# Patient Record
Sex: FEMALE | Race: WHITE | NOT HISPANIC OR LATINO | Employment: STUDENT | URBAN - METROPOLITAN AREA
[De-identification: names, ages, dates, MRNs, and addresses within clinical notes are randomized per-mention and may not be internally consistent; named-entity substitution may affect disease eponyms.]

---

## 2023-09-28 PROBLEM — R56.9 SEIZURE, CONVULSION (MULTI): Status: ACTIVE | Noted: 2023-09-28

## 2023-09-28 PROBLEM — F41.1 GENERALIZED ANXIETY DISORDER: Status: ACTIVE | Noted: 2023-09-28

## 2023-09-28 PROBLEM — F90.0 ATTENTION DEFICIT HYPERACTIVITY DISORDER (ADHD), PREDOMINANTLY INATTENTIVE TYPE: Status: ACTIVE | Noted: 2023-09-28

## 2023-09-28 PROBLEM — R53.83 FATIGUE: Status: ACTIVE | Noted: 2023-09-28

## 2023-09-28 PROBLEM — R55 EPISODE OF LOSS OF CONSCIOUSNESS: Status: ACTIVE | Noted: 2023-09-28

## 2023-09-28 PROBLEM — M25.851 FEMOROACETABULAR IMPINGEMENT OF RIGHT HIP: Status: ACTIVE | Noted: 2023-09-28

## 2023-09-28 PROBLEM — M25.852 FEMOROACETABULAR IMPINGEMENT OF LEFT HIP: Status: ACTIVE | Noted: 2023-09-28

## 2023-09-28 PROBLEM — M76.02 GLUTEAL TENDINITIS OF BOTH BUTTOCKS: Status: ACTIVE | Noted: 2023-09-28

## 2023-09-28 PROBLEM — M76.01 GLUTEAL TENDINITIS OF BOTH BUTTOCKS: Status: ACTIVE | Noted: 2023-09-28

## 2023-09-28 PROBLEM — F50.9 EATING DISORDER: Status: ACTIVE | Noted: 2023-09-28

## 2023-09-28 PROBLEM — F34.1 PERSISTENT DEPRESSIVE DISORDER WITH MIXED FEATURES, CURRENTLY MODERATE: Status: ACTIVE | Noted: 2023-09-28

## 2023-09-28 RX ORDER — EPINEPHRINE 0.3 MG/.3ML
INJECTION, SOLUTION INTRAMUSCULAR
COMMUNITY
Start: 2015-05-30

## 2023-09-28 RX ORDER — MIRTAZAPINE 7.5 MG/1
1 TABLET, FILM COATED ORAL NIGHTLY
COMMUNITY
Start: 2023-03-28 | End: 2023-10-07 | Stop reason: SDUPTHER

## 2023-09-28 RX ORDER — DEXTROAMPHETAMINE SACCHARATE, AMPHETAMINE ASPARTATE MONOHYDRATE, DEXTROAMPHETAMINE SULFATE AND AMPHETAMINE SULFATE 2.5; 2.5; 2.5; 2.5 MG/1; MG/1; MG/1; MG/1
1 CAPSULE, EXTENDED RELEASE ORAL DAILY
COMMUNITY
Start: 2023-01-12 | End: 2023-11-07 | Stop reason: SINTOL

## 2023-09-28 RX ORDER — SERTRALINE HYDROCHLORIDE 100 MG/1
1 TABLET, FILM COATED ORAL DAILY
COMMUNITY
Start: 2021-11-30 | End: 2023-11-07 | Stop reason: WASHOUT

## 2023-09-28 RX ORDER — FEXOFENADINE HCL AND PSEUDOEPHEDRINE HCI 180; 240 MG/1; MG/1
1 TABLET, EXTENDED RELEASE ORAL DAILY
COMMUNITY
Start: 2022-03-11

## 2023-09-28 RX ORDER — AZELASTINE 1 MG/ML
2 SPRAY, METERED NASAL 2 TIMES DAILY
COMMUNITY
Start: 2022-03-11

## 2023-09-28 RX ORDER — METHYLPHENIDATE HYDROCHLORIDE 27 MG/1
1 TABLET ORAL DAILY
COMMUNITY
Start: 2022-06-28 | End: 2023-11-07 | Stop reason: SINTOL

## 2023-09-28 RX ORDER — CETIRIZINE HYDROCHLORIDE 10 MG/1
TABLET ORAL
COMMUNITY
End: 2023-11-07 | Stop reason: WASHOUT

## 2023-09-28 RX ORDER — ATOMOXETINE 40 MG/1
CAPSULE ORAL
COMMUNITY
Start: 2023-08-10 | End: 2023-11-07 | Stop reason: SINTOL

## 2023-09-28 RX ORDER — MOMETASONE FUROATE 50 UG/1
SPRAY, METERED NASAL
COMMUNITY

## 2023-09-28 RX ORDER — CLONAZEPAM 2 MG/1
2 TABLET, ORALLY DISINTEGRATING ORAL AS NEEDED
COMMUNITY
Start: 2021-05-17 | End: 2023-11-07 | Stop reason: WASHOUT

## 2023-10-03 ENCOUNTER — APPOINTMENT (OUTPATIENT)
Dept: BEHAVIORAL HEALTH | Facility: CLINIC | Age: 18
End: 2023-10-03
Payer: COMMERCIAL

## 2023-10-07 DIAGNOSIS — F41.1 GENERALIZED ANXIETY DISORDER: ICD-10-CM

## 2023-10-07 DIAGNOSIS — F99 INSOMNIA DUE TO OTHER MENTAL DISORDER: Chronic | ICD-10-CM

## 2023-10-07 DIAGNOSIS — F51.05 INSOMNIA DUE TO OTHER MENTAL DISORDER: Chronic | ICD-10-CM

## 2023-10-07 PROBLEM — G47.00 INSOMNIA: Chronic | Status: ACTIVE | Noted: 2023-10-07

## 2023-10-07 RX ORDER — MIRTAZAPINE 7.5 MG/1
7.5 TABLET, FILM COATED ORAL NIGHTLY
Qty: 30 TABLET | Refills: 11 | Status: SHIPPED | OUTPATIENT
Start: 2023-10-07 | End: 2023-11-07 | Stop reason: SDUPTHER

## 2023-11-06 PROBLEM — R56.9 SEIZURE, CONVULSION (MULTI): Status: RESOLVED | Noted: 2023-09-28 | Resolved: 2023-11-06

## 2023-11-06 PROBLEM — R55 EPISODE OF LOSS OF CONSCIOUSNESS: Status: RESOLVED | Noted: 2023-09-28 | Resolved: 2023-11-06

## 2023-11-06 NOTE — PROGRESS NOTES
"Outpatient Child and Adolescent Psychiatry Follow-Up    Subjective   Emelia Box is a 18 y.o. female presenting for psychiatric follow-up.   Patient evaluated virtually     Chief Complaint:  Med Management and Follow-up    HPI:   Patient last evaluated 9/5, at which time atomoxetine was re-ordered with 90-d prescription due to insurance coverage concerns.     Update:   Overall, things are going \"pretty good\"   Trial of atomoxetine: \"I did not like it; not a fan\" - took it for a while (about a month), and then stopped due to similar adverse effects as stimulants (irritability, anxiety).   Vortioxetine: going fine, but about to run out (lost a bottle).    College ([employer], ~40min from home): \"Fine\" - much better than prior experience; \"most of my college are regular college problems\". Now living in a campus apartment (had to move from the dorm due to infrastructure problems; now in an apartment but far from campus. Not super close to roommate, but the situation is fine. 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   Sleep: \"fine\" / no concerns   Eating: good / no concerns   Depression: recent severity ~3-4/10 (from 4)   Anxiety: recent severity ~5/10 (from 4)   ADHD related concerns: \"I don't know\" - wondering if some element of procrastination and lack of motivation was situational (not liking where she went to school, etc); overall at [employer] \"I have been better at that general thing\"   Substances: denies all   Upcoming life events: looking forward to the end of the semester, but not to the holidays (family stress, unsure about excitement about work - but makes really good money).   Therapy: Hasn't been regularly engaging in therapy \"I know I should but I really hate it\"; \"I definitely will do it over the summer\"     Psychotropic management history:  8/10/23: initiated atomoxetine (40-80mg daily) for ADHD (prior intolerance of " "both methylphenidate and amphetamine stimulant formulations due to anxiety / agitation / irritability)  5/16/23: resumed mirtazapine 7.5mg qHS (intolerance of 15mg due to fatigue), discontinued Adderall XR 10mg (intolerance due to anxiety exacerbation)   4/13/23: increased mirtazapine to 15mg qHS due to good tolerance and partial response (brief duration of effect on eating)  3/28/23: added mirtazapine 7.5mg qHS for treatment of difficulty eating as well as sleep initiation insomnia   1/13/23: increased vortioxetine to 20mg daily (good tolerance, no noted benefits); switched from Concerta 27mg (good clinical benefit but not tolerated due to significant irritability) to Adderall XR 10mg   12/13/22: increased vortioxetine to 15mg daily   11/15/22: cross-tapererd from sertraline (100mg qAM) to vortioxetine 10mg daily   10/28/22: discontinued buspirone due to lack of perceived benefit and recent symptom exacerbation   10/17/22: continued uptitration of buspirone due to good tolerance without perceived benefit   10/4/22: initiated buspirone 10mg twice daily (plan for uptitration as tolerated); continued methylphenidate & sertraline   6/28/22: increased methylphenidate ER to 27mg qAM; continued sertraline 100mg qAM   6/17/22: initiated methylphenidate ER 18mg qAM for ADHD; continued sertraline 100mg qAM   11/5/22: increased sertraline to 100mg qAM   10/8/21: initiated cross-titration from bupropion XL 300mg daily, to sertraline 50mg daily, over 2 weeks.      Mental Status Exam:   Patient appropriately groomed, dressed in hospital gown; appearance is consistent with chronological age. Patient is calm and cooperative with appropriate eye contact; no psychomotor agitation or retardation and no EPS/TD noted. Speech with normal rate and rhythm, appropriate volume and tone; spontaneous and fluent. Patient states that mood is \"pretty good\", affect congruent with stated mood and with appropriate range. Thought process is " organized, linear, and goal directed with logical associations; patient does not endorse ideation of harm to self or others, does not endorse auditory or visual hallucinations, and does not appear to be responding to internal stimuli. No apparent deficits in memory, attention, concentration or language; fund of knowledge appears adequate for development and education. Insight and judgment are fair.     Vitals:   There were no vitals filed for this visit.    Current Medications:    Current Outpatient Medications:     amphetamine-dextroamphetamine XR (Adderall XR) 10 mg 24 hr capsule, Take 1 capsule (10 mg) by mouth once daily., Disp: , Rfl:     atomoxetine (Strattera) 40 mg capsule, Take one (1) capsule every morning for 3-5 days; if tolerating well, increase to two (2) capsules daily thereafter, Disp: , Rfl:     azelastine (Astelin) 137 mcg (0.1 %) nasal spray, Administer 2 sprays into each nostril 2 times a day., Disp: , Rfl:     cetirizine (ZyrTEC) 10 mg tablet, Take by mouth., Disp: , Rfl:     clonazePAM (KlonoPIN) 2 mg disintegrating tablet, Take 1 tablet (2 mg) by mouth if needed for seizures., Disp: , Rfl:     EPINEPHrine (Auvi-Q) 0.3 mg/0.3 mL injection syringe, Auvi-Q 0.3 MG/0.3ML Injection Solution Auto-injector Quantity: 2  Refills: 0  Start: 30-May-2015, Disp: , Rfl:     fexofenadine-pseudoephedrine (Allegra-D 24 Hour) 180-240 mg 24 hr tablet, Take 1 tablet by mouth once daily., Disp: , Rfl:     methylphenidate (Concerta) 27 mg daily tablet, Take 1 tablet (27 mg) by mouth once daily., Disp: , Rfl:     mirtazapine (Remeron) 7.5 mg tablet, Take 1 tablet (7.5 mg) by mouth once daily at bedtime., Disp: 30 tablet, Rfl: 11    mometasone (Nasonex) 50 mcg/actuation nasal spray, Administer into affected nostril(s)., Disp: , Rfl:     sertraline (Zoloft) 100 mg tablet, Take 1 tablet (100 mg) by mouth once daily., Disp: , Rfl:     vortioxetine (Trintellix) 20 mg tablet tablet, Take 1 tablet (20 mg) by mouth once  daily., Disp: , Rfl:     Assessment/Plan   Assessment:   Emelia Box is a 18 y.o. female  presenting for follow-up.     Diagnosis: No diagnosis found.     As of 11/07/2023:   Poor tolerance of atomoxetine; will discontinue - discussed options to trial alpha agonist for treatment of ADHD; will defer at present   Continue medications otherwise     Safety Risk Assessment: Based on her risk and protective factors, patient is presently at low risk of imminent suicide (risk factors include young age / psychiatric symptoms; protective factors include future orientation / treatment engagement). In addition, routine evaluation did not reveal any evidence indicating that the patient poses a substantial risk of imminent physical harm to others. As such, she does not currently meet criteria for (involuntary) psychiatric admission; as per discussion with patient and guardian the plan to mitigate her dynamic risk factors for harm towards self or others includes ongoing outpatient psychiatric care.    Treatment Plan/Recommendations:     Medications: Patient and guardian were educated on all medication changes including indications / alternatives / likely or potentially serious adverse effects, and verbalized understanding and consent to the regimen below.   Discontinue atomoxetine due to intolerance   Continue mirtazapine 7.5mg qHS for insomnia and eating difficulties (poor tolerance of 15mg dose due to fatigue)   Continue vortioxetine 20mg daily      Care coordination: Patient and guardian instructed to contact the clinic via MyChart or phone with medication questions or concerns, and were provided with resources and instructions for safety planning (including instructions to call / text / chat [phone], and to present to the nearest ED for imminent safety concerns).   Therapy: Agree with resuming counseling/therapy services (Katherin Pyle / [facility]) during the summer      Follow-up:   Return to clinic Tues Feb 13 at 1pm or sooner as needed.   Will continue to follow ADHD symptoms; may consider trial of alpha agonist (prior poor tolerance of multiple stimulants as well as atomoxetine)     Russell Erickson MD PhD

## 2023-11-07 ENCOUNTER — TELEMEDICINE (OUTPATIENT)
Dept: BEHAVIORAL HEALTH | Facility: CLINIC | Age: 18
End: 2023-11-07
Payer: COMMERCIAL

## 2023-11-07 DIAGNOSIS — F41.1 GENERALIZED ANXIETY DISORDER: ICD-10-CM

## 2023-11-07 DIAGNOSIS — F51.05 INSOMNIA DUE TO OTHER MENTAL DISORDER: Chronic | ICD-10-CM

## 2023-11-07 DIAGNOSIS — F99 INSOMNIA DUE TO OTHER MENTAL DISORDER: Chronic | ICD-10-CM

## 2023-11-07 DIAGNOSIS — F51.04 PSYCHOPHYSIOLOGICAL INSOMNIA: Chronic | ICD-10-CM

## 2023-11-07 DIAGNOSIS — F34.1 PERSISTENT DEPRESSIVE DISORDER WITH MIXED FEATURES, CURRENTLY MODERATE: ICD-10-CM

## 2023-11-07 DIAGNOSIS — F90.0 ATTENTION DEFICIT HYPERACTIVITY DISORDER (ADHD), PREDOMINANTLY INATTENTIVE TYPE: ICD-10-CM

## 2023-11-07 PROCEDURE — 99214 OFFICE O/P EST MOD 30 MIN: CPT | Performed by: STUDENT IN AN ORGANIZED HEALTH CARE EDUCATION/TRAINING PROGRAM

## 2023-11-07 RX ORDER — MIRTAZAPINE 7.5 MG/1
7.5 TABLET, FILM COATED ORAL NIGHTLY
Qty: 90 TABLET | Refills: 3 | Status: SHIPPED | OUTPATIENT
Start: 2023-11-07 | End: 2024-05-09 | Stop reason: SDUPTHER

## 2023-11-07 NOTE — PATIENT INSTRUCTIONS
Please take the following medications as prescribed:     mirtazapine (Remeron) 7.5 mg tablet, Take 1 tablet (7.5 mg) by mouth once daily at bedtime., Disp: 90 tablet, Rfl: 3    vortioxetine (Trintellix) 20 mg tablet tablet, Take 1 tablet (20 mg) by mouth once daily., Disp: 90 tablet, Rfl: 3     You are scheduled for a follow-up appointment Tues Feb 13 at 1pm. Please call [phone] if you would like to be seen sooner or need to reschedule for any reason.    Please contact the clinic through [employer] or by phone ([phone]) for any medication questions or concerns.   For urgent support with concerns including suicidal thoughts call or text [id number]), or chat to [email or web address] - services are available 24 hours per day, 7 days per week.   If you are worried about imminent risk of harm to self or others, immediately seek medical care by calling 911 or presenting to the nearest emergency department.

## 2024-02-13 ENCOUNTER — APPOINTMENT (OUTPATIENT)
Dept: BEHAVIORAL HEALTH | Facility: CLINIC | Age: 19
End: 2024-02-13
Payer: COMMERCIAL

## 2024-05-09 DIAGNOSIS — F51.05 INSOMNIA DUE TO OTHER MENTAL DISORDER: Chronic | ICD-10-CM

## 2024-05-09 DIAGNOSIS — F41.1 GENERALIZED ANXIETY DISORDER: ICD-10-CM

## 2024-05-09 DIAGNOSIS — F99 INSOMNIA DUE TO OTHER MENTAL DISORDER: Chronic | ICD-10-CM

## 2024-05-09 DIAGNOSIS — F34.1 PERSISTENT DEPRESSIVE DISORDER WITH MIXED FEATURES, CURRENTLY MODERATE: ICD-10-CM

## 2024-05-09 RX ORDER — MIRTAZAPINE 7.5 MG/1
7.5 TABLET, FILM COATED ORAL NIGHTLY
Qty: 90 TABLET | Refills: 3 | Status: SHIPPED | OUTPATIENT
Start: 2024-05-09 | End: 2025-05-09

## 2024-11-15 DIAGNOSIS — F41.1 GENERALIZED ANXIETY DISORDER: ICD-10-CM

## 2024-11-15 DIAGNOSIS — F34.1 PERSISTENT DEPRESSIVE DISORDER WITH MIXED FEATURES, CURRENTLY MODERATE: ICD-10-CM

## 2024-11-15 DIAGNOSIS — F99 INSOMNIA DUE TO OTHER MENTAL DISORDER: Chronic | ICD-10-CM

## 2024-11-15 DIAGNOSIS — F51.05 INSOMNIA DUE TO OTHER MENTAL DISORDER: Chronic | ICD-10-CM

## 2024-11-15 RX ORDER — MIRTAZAPINE 7.5 MG/1
7.5 TABLET, FILM COATED ORAL NIGHTLY
Qty: 90 TABLET | Refills: 0 | Status: SHIPPED | OUTPATIENT
Start: 2024-11-15 | End: 2025-02-13

## 2024-12-19 DIAGNOSIS — F99 INSOMNIA DUE TO OTHER MENTAL DISORDER: Chronic | ICD-10-CM

## 2024-12-19 DIAGNOSIS — F41.1 GENERALIZED ANXIETY DISORDER: ICD-10-CM

## 2024-12-19 DIAGNOSIS — F51.05 INSOMNIA DUE TO OTHER MENTAL DISORDER: Chronic | ICD-10-CM

## 2024-12-19 DIAGNOSIS — F34.1 PERSISTENT DEPRESSIVE DISORDER WITH MIXED FEATURES, CURRENTLY MODERATE: ICD-10-CM

## 2024-12-19 RX ORDER — MIRTAZAPINE 7.5 MG/1
7.5 TABLET, FILM COATED ORAL NIGHTLY
Qty: 30 TABLET | Refills: 2 | Status: SHIPPED | OUTPATIENT
Start: 2024-12-19 | End: 2025-03-19

## 2024-12-24 ENCOUNTER — APPOINTMENT (OUTPATIENT)
Dept: BEHAVIORAL HEALTH | Facility: CLINIC | Age: 19
End: 2024-12-24
Payer: COMMERCIAL

## 2025-01-07 ENCOUNTER — APPOINTMENT (OUTPATIENT)
Dept: BEHAVIORAL HEALTH | Facility: CLINIC | Age: 20
End: 2025-01-07
Payer: COMMERCIAL

## 2025-01-07 DIAGNOSIS — F99 INSOMNIA DUE TO OTHER MENTAL DISORDER: Chronic | ICD-10-CM

## 2025-01-07 DIAGNOSIS — F51.05 INSOMNIA DUE TO OTHER MENTAL DISORDER: Chronic | ICD-10-CM

## 2025-01-07 DIAGNOSIS — F90.0 ATTENTION DEFICIT HYPERACTIVITY DISORDER (ADHD), PREDOMINANTLY INATTENTIVE TYPE: ICD-10-CM

## 2025-01-07 DIAGNOSIS — F34.1 PERSISTENT DEPRESSIVE DISORDER WITH MIXED FEATURES, CURRENTLY MODERATE: ICD-10-CM

## 2025-01-07 DIAGNOSIS — F51.04 PSYCHOPHYSIOLOGICAL INSOMNIA: ICD-10-CM

## 2025-01-07 DIAGNOSIS — F41.1 GENERALIZED ANXIETY DISORDER: ICD-10-CM

## 2025-01-07 PROCEDURE — 99213 OFFICE O/P EST LOW 20 MIN: CPT | Performed by: STUDENT IN AN ORGANIZED HEALTH CARE EDUCATION/TRAINING PROGRAM

## 2025-01-07 PROCEDURE — 1036F TOBACCO NON-USER: CPT | Performed by: STUDENT IN AN ORGANIZED HEALTH CARE EDUCATION/TRAINING PROGRAM

## 2025-01-07 RX ORDER — MIRTAZAPINE 7.5 MG/1
7.5 TABLET, FILM COATED ORAL NIGHTLY
Qty: 90 TABLET | Refills: 3 | Status: SHIPPED | OUTPATIENT
Start: 2025-01-07 | End: 2026-01-07

## 2025-01-07 NOTE — PROGRESS NOTES
"Outpatient Child and Adolescent Psychiatry Follow-Up    Emelia Box is a 19 y.o. female (assigned female at birth) presenting for psychiatric follow-up.   Patient evaluated virtually  Virtual or Telephone Consent:   An interactive audio and video telecommunication system which permits real time communications between the patient (at the originating site) and provider (at the distant site) was utilized to provide this telehealth service.   Verbal consent was requested and obtained from Emelia Box on this date, 01/07/25 for a telehealth visit.      Chief Complaint:  Follow-up    HPI:   Patient last evaluated 11/7/23, at which time atomoxetine was discontinued (poor tolerance).     Subjective   Update:   Overall, life is \"good\"   College ([employer], ~40min from home): still on break; still studying [employer] and psych. Currently a farideh; will probably apply to graduate school in [employer] after completion (will explore during the summer). Not sure what she will do after grad school but \"I'm not really worried\". Will start \"[employer]\" (18mo senior thesis) when she returns, but not too worried about it; focus is on political theory.   Family relationships: no significant stresses - history of conflicts over the holidays but doing well this year.     Psychiatric ROS:   Current Sx priorities: doing \"very well\"  Sleep: no concerns   Eating: no concerns   Depression: recent severity ~2-3/10 (from 3-4)   Anxiety: recent severity ~5/10 (from 5); \"happy with it\"   ADHD related concerns: mostly able to focus when needing to  Substances: denies all     Mental health interventions:   Good adherence, good tolerance, good benefit   Vortioxetine 20mg  Mirtazapine 7.5mg   Therapy services: has resumed therapy (Katherin Pyle / [facility]) with good response      Psychotropic management history:  11/7/23: DC atomoxetine due to intolerance (irritability & anxiety - similar to adverse effects from stimulant " trials)  8/10/23: initiated atomoxetine (40-80mg daily) for ADHD (prior intolerance of both methylphenidate and amphetamine stimulant formulations due to anxiety / agitation / irritability)  5/16/23: resumed mirtazapine 7.5mg qHS (intolerance of 15mg due to fatigue), discontinued Adderall XR 10mg (intolerance due to anxiety exacerbation)   4/13/23: increased mirtazapine to 15mg qHS due to good tolerance and partial response (brief duration of effect on eating)  3/28/23: added mirtazapine 7.5mg qHS for treatment of difficulty eating as well as sleep initiation insomnia   1/13/23: increased vortioxetine to 20mg daily (good tolerance, no noted benefits); switched from Concerta 27mg (good clinical benefit but not tolerated due to significant irritability) to Adderall XR 10mg   12/13/22: increased vortioxetine to 15mg daily   11/15/22: cross-tapererd from sertraline (100mg qAM) to vortioxetine 10mg daily   10/28/22: discontinued buspirone due to lack of perceived benefit and recent symptom exacerbation   10/17/22: continued uptitration of buspirone due to good tolerance without perceived benefit   10/4/22: initiated buspirone 10mg twice daily (plan for uptitration as tolerated); continued methylphenidate & sertraline   6/28/22: increased methylphenidate ER to 27mg qAM; continued sertraline 100mg qAM   6/17/22: initiated methylphenidate ER 18mg qAM for ADHD; continued sertraline 100mg qAM   11/5/22: increased sertraline to 100mg qAM   10/8/21: initiated cross-titration from bupropion XL 300mg daily, to sertraline 50mg daily, over 2 weeks.       Objective   Mental Status Exam:   Patient appropriately groomed, dressed in work clothes ([employer]); appearance is consistent with chronological age. Patient is calm and cooperative with appropriate eye contact; no psychomotor agitation or retardation and no EPS/TD noted. Speech with normal rate and rhythm, appropriate volume and tone; spontaneous and fluent. Patient states that  "mood is \"good\", affect congruent with stated mood and with appropriate range. Thought process is organized, linear, and goal directed with logical associations; patient does not endorse ideation of harm to self or others, does not endorse auditory or visual hallucinations, and does not appear to be responding to internal stimuli. No apparent deficits in memory, attention, concentration or language; fund of knowledge appears adequate for development and education. Insight and judgment are fair.     Current Medications:    Current Outpatient Medications:     azelastine (Astelin) 137 mcg (0.1 %) nasal spray, Administer 2 sprays into each nostril 2 times a day., Disp: , Rfl:     EPINEPHrine (Auvi-Q) 0.3 mg/0.3 mL injection syringe, Auvi-Q 0.3 MG/0.3ML Injection Solution Auto-injector Quantity: 2  Refills: 0  Start: 30-May-2015, Disp: , Rfl:     fexofenadine-pseudoephedrine (Allegra-D 24 Hour) 180-240 mg 24 hr tablet, Take 1 tablet by mouth once daily., Disp: , Rfl:     mirtazapine (Remeron) 7.5 mg tablet, Take 1 tablet (7.5 mg) by mouth once daily at bedtime., Disp: 90 tablet, Rfl: 3    mometasone (Nasonex) 50 mcg/actuation nasal spray, Administer into affected nostril(s)., Disp: , Rfl:     vortioxetine (Trintellix) 20 mg tablet tablet, Take 1 tablet (20 mg) by mouth once daily., Disp: 90 tablet, Rfl: 3     Assessment:   Emelia Box is a 19 y.o. female (assigned female at birth) presenting for follow-up.     Diagnosis:   1. Attention deficit hyperactivity disorder (ADHD), predominantly inattentive type  Follow Up In Pediatric Psychiatry      2. Generalized anxiety disorder  mirtazapine (Remeron) 7.5 mg tablet    vortioxetine (Trintellix) 20 mg tablet tablet    Follow Up In Pediatric Psychiatry      3. Persistent depressive disorder with mixed features, currently moderate  vortioxetine (Trintellix) 20 mg tablet tablet    Follow Up In Pediatric Psychiatry      4. Psychophysiological insomnia  Follow Up In Pediatric " Psychiatry      5. Insomnia due to other mental disorder  mirtazapine (Remeron) 7.5 mg tablet    Follow Up In Pediatric Psychiatry        Assessment/Plan   As of 01/07/2025:   Good clinical stability, has resumed routine therapy services  [facility]: last filled diazepam 10mg #1 (by dentist) 2/26/24     Safety Risk Assessment: Based on her risk and protective factors, patient is presently at low risk of imminent suicide (risk factors include young age / psychiatric symptoms; protective factors include future orientation / treatment engagement). In addition, routine evaluation did not reveal any evidence indicating that the patient poses a substantial risk of imminent physical harm to others. As such, she does not currently meet criteria for (involuntary) psychiatric admission; as per discussion with patient and guardian the plan to mitigate her dynamic risk factors for harm towards self or others includes ongoing outpatient psychiatric care.    Treatment Plan/Recommendations:   Medications: Patient and guardian were educated on all medication changes including indications / alternatives / likely or potentially serious adverse effects, and verbalized understanding and consent to the regimen below.   Continue mirtazapine 7.5mg qHS for insomnia and eating difficulties (poor tolerance of 15mg dose due to fatigue)   Continue vortioxetine 20mg daily      Care coordination: Patient and guardian instructed to contact the clinic via MyChart or phone with medication questions or concerns, and were provided with resources and instructions for safety planning (including instructions to call / text / chat [phone], and to present to the nearest ED for imminent safety concerns).   Therapy: Agree ongoing counseling/therapy services (Katherin Pyle / [facility])     Follow-up:   Follow up 12/23 at 1pm (30min virtual) or sooner as needed.   Will continue to follow ADHD symptoms; may  consider trial of alpha agonist (prior poor tolerance of multiple stimulants as well as atomoxetine)      Russell Erickson MD PhD

## 2025-01-24 ENCOUNTER — TELEPHONE (OUTPATIENT)
Dept: BEHAVIORAL HEALTH | Facility: CLINIC | Age: 20
End: 2025-01-24
Payer: COMMERCIAL

## 2025-05-22 NOTE — PROGRESS NOTES
"Outpatient Child and Adolescent Psychiatry Follow-Up    Emelia Box is a 19 y.o. female (assigned female at birth) presenting for psychiatric follow-up.   Patient evaluated virtually   Virtual or Telephone Consent:   An interactive audio and video telecommunication system which permits real time communications between the patient (at the originating site) and provider (at the distant site) was utilized to provide this telehealth service.   Verbal consent was requested and obtained from Emelia Box on this date, 05/27/25 for a telehealth visit and the patient's location was confirmed at the time of the visit.     Chief Complaint:  Follow-up    HPI:   Patient last evaluated 1/7, at which time pt reported interval good clinical stability in the context of resumption of therapy services.     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 Semester went \"really well\". 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   Family relationships: doing \"fine\"; parental strains not as bothersome as in the past     Psychiatric ROS:   Current Sx priorities: doing \"fine\"   Sleep: \"fine but I think that's because of the mirtazapine\"   Eating: Concerned for overeating recently, with associated weight gain - has recently had to get rid of clothes due to poor fit (gotten too snug). Initially the appetite increase from mirtazapine was desired, but less helpful now. Has \"trouble knowing when I'm full\", has a hard time stopping once she starts eating. Has a high appetite - wakes up \"ravenous\" and stays that way through the day. Does eat fairly quickly, but not usually alone.   Depression: recent " "severity ~3/10 (from 2-3)   Anxiety: recent severity ~5/10 (from 5); remains \"fine\" with it   ADHD related concerns: recent exacerbation of functional impairment due to inattention, with efforts to study for LSATs  Substances: denies all     Mental health interventions:   Good adherence, good response.   Vortioxetine 20mg  Mirtazapine 7.5mg   Therapy services (Katherin Pyle / [facility]): Ongoing engagement - had been seeing about twice per week; still helpful, but hasn't seen since school got out (~2wks ago)    Psychotropic management history:  11/7/23: DC atomoxetine due to intolerance (irritability & anxiety - similar to adverse effects from stimulant trials)  8/10/23: initiated atomoxetine (40-80mg daily) for ADHD (prior intolerance of both methylphenidate and amphetamine stimulant formulations due to anxiety / agitation / irritability)  5/16/23: resumed mirtazapine 7.5mg qHS (intolerance of 15mg due to fatigue), discontinued Adderall XR 10mg (intolerance due to anxiety exacerbation)  4/13/23: increased mirtazapine to 15mg qHS due to good tolerance and partial response (brief duration of effect on eating)  3/28/23: added mirtazapine 7.5mg qHS for treatment of difficulty eating as well as sleep initiation insomnia   1/13/23: increased vortioxetine to 20mg daily (good tolerance, no noted benefits); switched from Concerta 27mg (good clinical benefit but not tolerated due to significant irritability) to Adderall XR 10mg   12/13/22: increased vortioxetine to 15mg daily   11/15/22: cross-tapererd from sertraline (100mg qAM) to vortioxetine 10mg daily   10/28/22: discontinued buspirone due to lack of perceived benefit and recent symptom exacerbation   10/17/22: continued uptitration of buspirone due to good tolerance without perceived benefit   10/4/22: initiated buspirone 10mg twice daily (plan for uptitration as tolerated); continued methylphenidate & sertraline   6/28/22: " "increased methylphenidate ER to 27mg qAM; continued sertraline 100mg qAM   6/17/22: initiated methylphenidate ER 18mg qAM for ADHD; continued sertraline 100mg qAM   11/5/22: increased sertraline to 100mg qAM   10/8/21: initiated cross-titration from bupropion XL 300mg daily, to sertraline 50mg daily, over 2 weeks      Objective   Mental Status Exam:   Patient appropriately groomed, dressed in sweats; appearance is consistent with chronological age. Patient is calm and cooperative with appropriate eye contact; no psychomotor agitation or retardation and no EPS/TD noted. Speech with normal rate and rhythm, appropriate volume and tone; spontaneous and fluent. Patient states that mood is \"pretty good\", affect congruent with stated mood and with appropriate range. Thought process is organized, linear, and goal directed with logical associations; patient does not endorse ideation of harm to self or others, does not endorse auditory or visual hallucinations, and does not appear to be responding to internal stimuli. No apparent deficits in memory, attention, concentration or language; fund of knowledge appears adequate for development and education. Insight and judgment are fair.      Current Medications:  Current Medications[1]     Assessment:   Emelia Box is a 19 y.o. female (assigned female at birth) presenting for follow-up.     Diagnosis:   1. Generalized anxiety disorder  Follow Up In Pediatric Psychiatry      2. Persistent depressive disorder with mixed features, currently moderate  Follow Up In Pediatric Psychiatry      3. Attention deficit hyperactivity disorder (ADHD), predominantly inattentive type  Follow Up In Pediatric Psychiatry      4. Psychophysiological insomnia  Follow Up In Pediatric Psychiatry        Assessment/Plan   As of 05/27/2025:   Interval difficulty with increased appetite / overeating - will discontinue mirtazapine and follow symptoms clinically (including both appetite / eating as well as " sleep initiation insomnia). Recent increase in functional impairment due to ADHD; will re-initiate low-dose bupropion and also resume therapeutic engagement (with focus on executive function skills).    Discussed ADHD pharmacotherapy options, including re-challenge with stimulants, viloxazine (prior trial of atomoxetine / poorly tolerated), and bupropion. Patient not interested in stimulant challenge (poor tolerance of caffeine as well). Due to patient prioritization of avoiding risk of anxiety exacerbation (which previously occurred with atomoxetine), will proceed with cautious reintroduction of bupropion XL.   Patient with history of single clinical seizure like episode (prior to establishing with me) that was chronologically associated with increase in bupropion to 450mg; seen by neurology without concerning findings, and no prior or subsequent episodes, including during ramp or taper of this medication. As such, she is unlikely to to have a seizure due to reinitiation of bupropion (particularly at low doses).   OARRS: no interval pertinent fills     Safety Risk Assessment: Based on her risk and protective factors, patient is presently at low risk of imminent suicide (risk factors include young age / psychiatric symptoms; protective factors include future orientation / treatment engagement). In addition, routine evaluation did not reveal any evidence indicating that the patient poses a substantial risk of imminent physical harm to others. As such, she does not currently meet criteria for (involuntary) psychiatric admission; as per discussion with patient and guardian the plan to mitigate her dynamic risk factors for harm towards self or others includes ongoing outpatient psychiatric care.    Treatment Plan/Recommendations:   Medications: Patient and guardian were educated on all medication changes including indications / alternatives / likely or potentially serious adverse effects, and verbalized understanding  and consent to the regimen below.   Initiate bupropion XL 150mg daily for ADHD   DC mirtazapine 7.5mg qHS (poor tolerance due to struggle with increased appetite)  Continue vortioxetine 20mg daily      Care coordination: Patient and guardian instructed to contact the clinic via MyChart or phone with medication questions or concerns, and were provided with resources and instructions for safety planning (including instructions to call / text / chat [phone], and to present to the nearest ED for imminent safety concerns).   Therapy: Agree ongoing counseling/therapy services (Katherin Pyle / [facility], [facility])     Follow-up:   Follow up July 8 at 2pm (30min virtual) or sooner as needed.   Will FU tolerance of & response to discontinuation of mirtazapine (previously helpful with insomnia, but recent intolerance due to appetite increase) and initiation of bupropion XL 150mg for ADHD. May consider titration of bupropion dose to 300mg (will use caution in potential increases above that dose given history of clinical seizure like episode that was chronologically associated with bupropion 450mg), vs transition to viloxazine if poorly tolerated, vs initiation of alternative pharmacotherapy for sleep initiation insomnia.   Will continue to follow for eating difficulties (uncertain risk of binge eating disorder given comorbid ADHD).      Russell Erickson MD PhD         [1]   Current Outpatient Medications:     EPINEPHrine (Auvi-Q) 0.3 mg/0.3 mL injection syringe, Auvi-Q 0.3 MG/0.3ML Injection Solution Auto-injector Quantity: 2  Refills: 0  Start: 30-May-2015, Disp: , Rfl:     fexofenadine-pseudoephedrine (Allegra-D 24 Hour) 180-240 mg 24 hr tablet, Take 1 tablet by mouth once daily., Disp: , Rfl:     mirtazapine (Remeron) 7.5 mg tablet, Take 1 tablet (7.5 mg) by mouth once daily at bedtime., Disp: 90 tablet, Rfl: 3    vortioxetine (Trintellix) 20 mg tablet tablet, Take 1 tablet (20 mg) by  mouth once daily., Disp: 90 tablet, Rfl: 3

## 2025-05-27 ENCOUNTER — APPOINTMENT (OUTPATIENT)
Dept: BEHAVIORAL HEALTH | Facility: CLINIC | Age: 20
End: 2025-05-27
Payer: COMMERCIAL

## 2025-05-27 DIAGNOSIS — F41.1 GENERALIZED ANXIETY DISORDER: ICD-10-CM

## 2025-05-27 DIAGNOSIS — F90.0 ATTENTION DEFICIT HYPERACTIVITY DISORDER (ADHD), PREDOMINANTLY INATTENTIVE TYPE: ICD-10-CM

## 2025-05-27 DIAGNOSIS — F34.1 PERSISTENT DEPRESSIVE DISORDER WITH MIXED FEATURES, CURRENTLY MODERATE: ICD-10-CM

## 2025-05-27 DIAGNOSIS — F51.04 PSYCHOPHYSIOLOGICAL INSOMNIA: ICD-10-CM

## 2025-05-27 PROCEDURE — 1036F TOBACCO NON-USER: CPT | Performed by: STUDENT IN AN ORGANIZED HEALTH CARE EDUCATION/TRAINING PROGRAM

## 2025-05-27 PROCEDURE — 99215 OFFICE O/P EST HI 40 MIN: CPT | Performed by: STUDENT IN AN ORGANIZED HEALTH CARE EDUCATION/TRAINING PROGRAM

## 2025-06-09 DIAGNOSIS — F51.05 INSOMNIA DUE TO OTHER MENTAL DISORDER: Chronic | ICD-10-CM

## 2025-06-09 DIAGNOSIS — F34.1 PERSISTENT DEPRESSIVE DISORDER WITH MIXED FEATURES, CURRENTLY MODERATE: ICD-10-CM

## 2025-06-09 DIAGNOSIS — F99 INSOMNIA DUE TO OTHER MENTAL DISORDER: Chronic | ICD-10-CM

## 2025-06-09 DIAGNOSIS — F41.1 GENERALIZED ANXIETY DISORDER: ICD-10-CM

## 2025-06-09 RX ORDER — MIRTAZAPINE 7.5 MG/1
7.5 TABLET, FILM COATED ORAL NIGHTLY
Qty: 90 TABLET | Refills: 3 | Status: SHIPPED | OUTPATIENT
Start: 2025-06-09 | End: 2026-06-09

## 2025-07-08 ENCOUNTER — APPOINTMENT (OUTPATIENT)
Dept: BEHAVIORAL HEALTH | Facility: CLINIC | Age: 20
End: 2025-07-08
Payer: COMMERCIAL

## 2025-07-08 DIAGNOSIS — F51.04 PSYCHOPHYSIOLOGICAL INSOMNIA: ICD-10-CM

## 2025-07-08 DIAGNOSIS — F34.1 PERSISTENT DEPRESSIVE DISORDER WITH MIXED FEATURES, CURRENTLY MODERATE: ICD-10-CM

## 2025-07-08 DIAGNOSIS — F90.0 ATTENTION DEFICIT HYPERACTIVITY DISORDER (ADHD), PREDOMINANTLY INATTENTIVE TYPE: ICD-10-CM

## 2025-07-08 DIAGNOSIS — F41.1 GENERALIZED ANXIETY DISORDER: ICD-10-CM

## 2025-07-08 PROCEDURE — 99214 OFFICE O/P EST MOD 30 MIN: CPT | Performed by: STUDENT IN AN ORGANIZED HEALTH CARE EDUCATION/TRAINING PROGRAM

## 2025-07-08 RX ORDER — BUPROPION HYDROCHLORIDE 300 MG/1
300 TABLET ORAL DAILY
Qty: 30 TABLET | Refills: 11 | Status: CANCELLED | OUTPATIENT
Start: 2025-07-08 | End: 2026-07-08

## 2025-07-08 NOTE — PROGRESS NOTES
"Outpatient Child and Adolescent Psychiatry Follow-Up    Emelia Box is a 19 y.o. female (assigned female at birth) presenting for psychiatric follow-up.   Patient evaluated virtually   Virtual or Telephone Consent:   An interactive audio and video telecommunication system which permits real time communications between the patient (at the originating site) and provider (at the distant site) was utilized to provide this telehealth service.   Verbal consent was requested and obtained from Emelia Box on this date, 07/11/25 for a telehealth visit and the patient's location was confirmed at the time of the visit.     Chief Complaint:  Follow-up    HPI:   Patient last evaluated 5/27, at which time pt reported interval difficulty with increased appetite / overeating, recent increase in functional impairment due to ADHD.   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 Struggling to study for LSATs (didn't take a formal course or anything); trajectory of practice tests is \"not great\" although starting point was pretty good (should be able to get into a law school on the basis of initial scores - struggling to motivate herself to study for the purpose of being competitive for more difficult schools).   Summer: Mini internship is \"fine\" - \"doing nothing\".      Psychiatric ROS:   Current Sx priorities: sleep and ADHD   Sleep: recurrence of sleep problems after discontinuation of mirtazapine, although \"also gotten a little better.\" Currently sleeps about 6 hours per night (prior " "lifetime norm was closer to 10 hours); variable duration of delay to sleep initiation; struggles are more with waking up early (also has difficulty falling back asleep if wakes up overnight) - also feels less rested during the day. No nightmares, no obvious reasons for waking. Rare daytime naps. No known leg cramps. Sleep \"fine\" for now - but won't be when classes resume; interested in starting intervention now.   Eating (concern for excessive eating attributed to mirtazapine - stopped 5/27/25): appetite seems to have gotten \"a little more normal\" since stopping mirtazapine   Depression: recent severity ~3/10 (from 3)   Anxiety: recent severity ~6/10 (from 5)  ADHD related concerns: no noted changes following re-initiation of bupropion - supposed to be studying for LSATs, and struggling - but hard to know how much of this is due to ADHD   Substances (denies all)    Mental health interventions:   Good adherence  Vortioxetine 20mg  Bupropion XL 150mg daily (resumed 5/27/25): good tolerance (after one week that was \"pretty awful\" - throwing up, not eating or sleeping)  Therapy services (Katherin Pyle / [facility]): resumed regular sessions     Psychotropic management history:  5/27/25: DC mirtazapine 7.5mg at bedtime due to concern for associated increase in appetite / eating; re-initiated bupropion XL 150mg daily for ADHD (exacerbation of functional impairment with increased academic demands)   11/7/23: DC atomoxetine due to intolerance (irritability & anxiety - similar to adverse effects from stimulant trials)  8/10/23: initiated atomoxetine (40-80mg daily) for ADHD (prior intolerance of both methylphenidate and amphetamine stimulant formulations due to anxiety / agitation / irritability)  5/16/23: resumed mirtazapine 7.5mg qHS (intolerance of 15mg due to fatigue), discontinued Adderall XR 10mg (intolerance due to anxiety exacerbation)  4/13/23: increased mirtazapine to 15mg qHS " "due to good tolerance and partial response (brief duration of effect on eating)  3/28/23: added mirtazapine 7.5mg qHS for treatment of difficulty eating as well as sleep initiation insomnia   1/13/23: increased vortioxetine to 20mg daily (good tolerance, no noted benefits); switched from Concerta 27mg (good clinical benefit but not tolerated due to significant irritability) to Adderall XR 10mg   12/13/22: increased vortioxetine to 15mg daily   11/15/22: cross-tapererd from sertraline (100mg qAM) to vortioxetine 10mg daily   10/28/22: discontinued buspirone due to lack of perceived benefit and recent symptom exacerbation   10/17/22: continued uptitration of buspirone due to good tolerance without perceived benefit   10/4/22: initiated buspirone 10mg twice daily (plan for uptitration as tolerated); continued methylphenidate & sertraline   6/28/22: increased methylphenidate ER to 27mg qAM; continued sertraline 100mg qAM   6/17/22: initiated methylphenidate ER 18mg qAM for ADHD; continued sertraline 100mg qAM   11/5/22: increased sertraline to 100mg qAM   10/8/21: initiated cross-titration from bupropion XL 300mg daily, to sertraline 50mg daily, over 2 weeks      Objective   Mental Status Exam:   Patient appropriately groomed, dressed in casual clothes; appearance is consistent with chronological age. Patient is calm and cooperative with appropriate eye contact; no psychomotor agitation or retardation and no EPS/TD noted. Speech with normal rate and rhythm, appropriate volume and tone; spontaneous and fluent. Patient states that mood is \"okay\", affect congruent with stated mood and with appropriate range. Thought process is organized, linear, and goal directed with logical associations; patient does not endorse ideation of harm to self or others, does not endorse auditory or visual hallucinations, and does not appear to be responding to internal stimuli. No apparent deficits in memory, attention, concentration or " "language; fund of knowledge appears adequate for development and education. Insight and judgment are fair.     Vitals:   There were no vitals filed for this visit.      4/26/2021     1:59 PM 5/17/2021    10:47 AM 9/22/2021     9:31 AM 10/25/2021     9:49 AM 6/20/2022     5:22 PM 6/29/2022     4:49 PM 6/29/2023    10:07 AM   Vitals   Systolic 116 132 122 115 118 109 115   Diastolic 68 85 68 75 75 69 74   Heart Rate 76 91 88 100 76 73 97   Temp       36.6 °C (97.9 °F)   Height  1.582 m (5' 2.28\") 1.588 m (5' 2.5\") 1.59 m (5' 2.6\")   1.575 m (5' 2\")   Weight (lb) 109 108.47 106.48 109.2 113 111 109.38   BMI  19.66 kg/m2 19.17 kg/m2 19.59 kg/m2 20.27 kg/m2 19.91 kg/m2 20 kg/m2   BSA (m2)  1.47 m2 1.46 m2 1.48 m2 1.51 m2 1.49 m2 1.47 m2      Current Medications:  Current Medications[1]     Assessment:   Emelia Box is a 19 y.o. female (assigned female at birth) presenting for follow-up.     Diagnosis:   1. Generalized anxiety disorder  Follow Up In Pediatric Psychiatry    Follow Up In Pediatric Psychiatry      2. Persistent depressive disorder with mixed features, currently moderate  Follow Up In Pediatric Psychiatry    Follow Up In Pediatric Psychiatry      3. Attention deficit hyperactivity disorder (ADHD), predominantly inattentive type  Follow Up In Pediatric Psychiatry    Follow Up In Pediatric Psychiatry    viloxazine 200 mg capsule,extended release 24hr      4. Psychophysiological insomnia  Follow Up In Pediatric Psychiatry    Follow Up In Pediatric Psychiatry    traZODone (Desyrel) 50 mg tablet        Assessment/Plan   As of 07/08/2025:   Interval improvement of appetite concerns but also recurrence of sleep difficulty, following discontinuation of mirtazapine; no significant benefit noted from bupropion.   Due to ineffectiveness of bupropion at present dose as well as metabolic interaction with vortioxetine (via CYP2D6 inhibition) will DC bupropion, and initiate trial of viloxazine for ADHD treatment (context " of prior failure of multiple stimulant trials, as well as atomoxetine).   Will initiate trazodone for insomnia   Patient instructed to titrate trazodone to goal prior to initiation of viloxazine  OARRS: no interval pertinent fills     Safety Risk Assessment: Based on her risk and protective factors, patient is presently at low risk of imminent suicide (risk factors include young age / psychiatric symptoms; protective factors include future orientation / treatment engagement). In addition, routine evaluation did not reveal any evidence indicating that the patient poses a substantial risk of imminent physical harm to others. As such, she does not currently meet criteria for (involuntary) psychiatric admission; as per discussion with patient and guardian the plan to mitigate her dynamic risk factors for harm towards self or others includes ongoing outpatient psychiatric care.    Treatment Plan/Recommendations:   Medications: Patient and guardian were educated on all medication changes including indications / alternatives / likely or potentially serious adverse effects, and verbalized understanding and consent to the regimen below.   DC bupropion XL 150mg daily for ADHD   Initiate trazodone 25-50mg at bedtime for treatment of insomnia. Okay to increase as tolerated every ~5-7 days in 25-50mg increments to maximum of 100mg/d. Common adverse effects usually resolve within days to two weeks, and include nausea or vomiting / fatigue / dizziness / constipation / dry mouth / headaches.   Initiate viloxazine XR (Qelbree) 200mg daily for ADHD; if tolerating well at next visit anticipate increasing to 400mg daily; if symptoms remain problematic after additional 6-12 weeks may increase to 600mg daily thereafter (maximum standard dose). Common adverse effects include increase in heart rate and diastolic blood pressure, fatigue / drowsiness, headaches, insomnia, GI upset, and irritability.   Continue vortioxetine 20mg daily       Care coordination: Patient and guardian instructed to contact the clinic via MyChart or phone with medication questions or concerns, and were provided with resources and instructions for safety planning (including instructions to call / text / chat [phone], and to present to the nearest ED for imminent safety concerns).   Therapy: Agree ongoing counseling/therapy services (Katherin Pyle / [facility], [facility])     Follow-up:   Follow up 8/12 at 3pm (30min virtual) or sooner as needed.   Will FU tolerance of & response to med changes (discontinuation of bupropion, initiation & titration of trazodone, initiation of viloxazine). If continuing to struggle with sleep maintenance may consider iron studies.   Will continue to follow for eating difficulties (uncertain risk of binge eating disorder given comorbid ADHD).      Russell Erickson MD PhD         [1]   Current Outpatient Medications:     EPINEPHrine (Auvi-Q) 0.3 mg/0.3 mL injection syringe, Auvi-Q 0.3 MG/0.3ML Injection Solution Auto-injector Quantity: 2  Refills: 0  Start: 30-May-2015, Disp: , Rfl:     fexofenadine-pseudoephedrine (Allegra-D 24 Hour) 180-240 mg 24 hr tablet, Take 1 tablet by mouth once daily., Disp: , Rfl:     traZODone (Desyrel) 50 mg tablet, Take a half tablet (25mg) at night for insomnia; you can increase the dose in additional 25mg (half tablet) increments every 5-7 days as tolerated to a maximum of 100mg (2 tablets)., Disp: 60 tablet, Rfl: 0    viloxazine 200 mg capsule,extended release 24hr, Take 1 capsule (200 mg) by mouth once daily., Disp: 30 capsule, Rfl: 1    vortioxetine (Trintellix) 20 mg tablet tablet, Take 1 tablet (20 mg) by mouth once daily., Disp: 90 tablet, Rfl: 3

## 2025-07-11 RX ORDER — TRAZODONE HYDROCHLORIDE 50 MG/1
TABLET ORAL
Qty: 60 TABLET | Refills: 0 | Status: SHIPPED | OUTPATIENT
Start: 2025-07-11

## 2025-08-12 ENCOUNTER — APPOINTMENT (OUTPATIENT)
Dept: BEHAVIORAL HEALTH | Facility: CLINIC | Age: 20
End: 2025-08-12
Payer: COMMERCIAL

## 2025-08-12 DIAGNOSIS — F90.0 ATTENTION DEFICIT HYPERACTIVITY DISORDER (ADHD), PREDOMINANTLY INATTENTIVE TYPE: ICD-10-CM

## 2025-08-12 DIAGNOSIS — F51.04 PSYCHOPHYSIOLOGICAL INSOMNIA: ICD-10-CM

## 2025-08-12 DIAGNOSIS — F41.1 GENERALIZED ANXIETY DISORDER: ICD-10-CM

## 2025-08-12 DIAGNOSIS — F34.1 PERSISTENT DEPRESSIVE DISORDER WITH MIXED FEATURES, CURRENTLY MODERATE: ICD-10-CM

## 2025-08-12 PROCEDURE — 1036F TOBACCO NON-USER: CPT | Performed by: STUDENT IN AN ORGANIZED HEALTH CARE EDUCATION/TRAINING PROGRAM

## 2025-08-12 PROCEDURE — 99214 OFFICE O/P EST MOD 30 MIN: CPT | Performed by: STUDENT IN AN ORGANIZED HEALTH CARE EDUCATION/TRAINING PROGRAM

## 2025-08-12 RX ORDER — TRAZODONE HYDROCHLORIDE 50 MG/1
100 TABLET ORAL NIGHTLY
Qty: 180 TABLET | Refills: 0 | Status: SHIPPED | OUTPATIENT
Start: 2025-08-12 | End: 2025-11-10

## 2025-08-25 DIAGNOSIS — F51.04 PSYCHOPHYSIOLOGICAL INSOMNIA: ICD-10-CM

## 2025-08-25 DIAGNOSIS — F90.0 ATTENTION DEFICIT HYPERACTIVITY DISORDER (ADHD), PREDOMINANTLY INATTENTIVE TYPE: ICD-10-CM

## 2025-08-25 DIAGNOSIS — F41.1 GENERALIZED ANXIETY DISORDER: ICD-10-CM

## 2025-08-25 DIAGNOSIS — F34.1 PERSISTENT DEPRESSIVE DISORDER WITH MIXED FEATURES, CURRENTLY MODERATE: ICD-10-CM

## 2025-08-25 RX ORDER — TRAZODONE HYDROCHLORIDE 50 MG/1
100 TABLET ORAL NIGHTLY
Qty: 180 TABLET | Refills: 0 | Status: SHIPPED | OUTPATIENT
Start: 2025-08-25 | End: 2025-11-23

## 2025-09-23 ENCOUNTER — APPOINTMENT (OUTPATIENT)
Dept: BEHAVIORAL HEALTH | Facility: CLINIC | Age: 20
End: 2025-09-23
Payer: COMMERCIAL